# Patient Record
Sex: MALE | Race: WHITE | NOT HISPANIC OR LATINO | Employment: FULL TIME | URBAN - NONMETROPOLITAN AREA
[De-identification: names, ages, dates, MRNs, and addresses within clinical notes are randomized per-mention and may not be internally consistent; named-entity substitution may affect disease eponyms.]

---

## 2020-12-10 ENCOUNTER — CONSULT (OUTPATIENT)
Dept: SLEEP MEDICINE | Facility: HOSPITAL | Age: 30
End: 2020-12-10

## 2020-12-10 VITALS
WEIGHT: 263 LBS | OXYGEN SATURATION: 98 % | HEIGHT: 72 IN | SYSTOLIC BLOOD PRESSURE: 139 MMHG | BODY MASS INDEX: 35.62 KG/M2 | DIASTOLIC BLOOD PRESSURE: 90 MMHG | HEART RATE: 94 BPM

## 2020-12-10 DIAGNOSIS — G47.19 EXCESSIVE DAYTIME SLEEPINESS: ICD-10-CM

## 2020-12-10 DIAGNOSIS — F51.04 PSYCHOPHYSIOLOGICAL INSOMNIA: ICD-10-CM

## 2020-12-10 DIAGNOSIS — R06.83 SNORING: Primary | ICD-10-CM

## 2020-12-10 PROCEDURE — 99204 OFFICE O/P NEW MOD 45 MIN: CPT | Performed by: NURSE PRACTITIONER

## 2020-12-10 RX ORDER — ZOLPIDEM TARTRATE 10 MG/1
TABLET ORAL
COMMUNITY
Start: 2020-11-13

## 2020-12-10 NOTE — PROGRESS NOTES
New Patient Sleep Medicine Consultation    Encounter Date: 12/10/2020         Patient's Primary Care Provider: Provider, No Known  Referring Provider: No ref. provider found  Reason for consultation/chief complaint: snoring, awakening gasping for breath, witnessed apneas, excessive daytime sleepiness and insomnia    Kurtis Chen is a 30 y.o. male who admits to snoring, gasping during sleep, working night shift or rotataing shifts, excessive daytime sleepiness, stop breathing during sleep, irritability, teeth grinding, sleepwalking or sleeptalking, difficulty falling asleep, difficulty staying asleep and takes medicine to help go to sleep.     He denies cataplexy, sleep paralysis, or hypnagogic hallucinations. His bedtime is ~ 1000. He  falls asleep after 5 minutes, and is up 0 times per day. He wakes up ~ 1630. He endorses 5-7 hours of sleep. He drinks 0 cups of coffee, 1 teas, and 1 sodas per week. He drinks 1-3 alcoholic beverages per week. He is not a current smoker. He has tried taking ambien but states it made him have problems falling asleep. He also has taken melatonin which he states helps with sleep at times. He has no sleepiness with driving. He does not take naps.     [place] - [address]      Past Medical History:   Diagnosis Date   • Asthma         Social History     Socioeconomic History   • Marital status:      Spouse name: Not on file   • Number of children: Not on file   • Years of education: Not on file   • Highest education level: Not on file     History reviewed. No pertinent family history.     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      Vitals:    12/10/20 0911   BP: 139/90   Pulse: 94   SpO2: 98%           12/10/20  0911   Weight: 119 kg (263 lb)       Body mass index is 35.67 kg/m². Patient's Body mass index is 35.67 kg/m². BMI is above normal parameters. Recommendations include: referral to primary care.      Neck circumference: 16.5\"          General: Alert. Cooperative. Well developed. No acute distress.             Head:  Normocephalic. Symmetrical. Atraumatic.              Eyes: Sclera clear. No icterus. PERRLA. Normal EOM.             Ears: No deformities. Normal hearing.             Nose: No septal deviation. No drainage.          Throat: No oral lesions. No thrush. Moist mucous membranes. Trachea midline    Tongue is normal     Dentition is good       Pharynx: Posterior pharyngeal pillars are narrow    Mallampati score of II (hard and soft palate, upper portion of tonsils anduvula visible)    Pharynx is normal with unrermarkable tonsils   Chest Wall:  Normal shape. Symmetric expansion with respiration. No tenderness.          Lungs:  Clear to auscultation bilaterally. No wheezes. No rhonchi. No rales. Respirations regular, even and unlabored.            Heart:  Regular rhythm and normal rate. Normal S1 and S2. No murmur.     Abdomen:  Soft, non-tender and non-distended. Normal bowel sounds. No masses.  Extremities:  Moves all extremities well. No edema.           Pulses: Pulses palpable and equal bilaterally.               Skin: Dry. Intact. No bleeding. No rash.           Neuro: Moves all 4 extremities and cranial nerves grossly intact.  Psychiatric: Normal mood and affect.      Current Outpatient Medications:   •  " zolpidem (AMBIEN) 10 MG tablet, , Disp: , Rfl:     No results found for: WBC, RBC, HGB, HCT, MCV, MCH, MCHC, RDW, RDWSD, MPV, PLT, NEUTRORELPCT, LYMPHORELPCT, MONORELPCT, EOSRELPCT, BASORELPCT, AUTOIGPER, NEUTROABS, LYMPHSABS, MONOSABS, EOSABS, BASOSABS, AUTOIGNUM, NRBC    Contraindications to home sleep test: none    ASSESSMENT:  1. Excessive daytime sleepiness, presumed obstructive sleep apnea - New (to me), additional work-up planned (4)  1. Check home sleep study   2. Snoring, presumed obstructive sleep apnea - New (to me), additional work-up planned (4)  1. As abvove        3. Insomnia - New (to me) additional workup planned   1.   As above    Total time spent: 30 min, more than half spent in face to face counseling and coordination of care.      RTC 2 weeks after testing         This document has been electronically signed by BRIDGET Pollard on December 10, 2020 09:29 CST          CC: Provider, No Known          No ref. provider found